# Patient Record
Sex: FEMALE | Race: WHITE
[De-identification: names, ages, dates, MRNs, and addresses within clinical notes are randomized per-mention and may not be internally consistent; named-entity substitution may affect disease eponyms.]

---

## 2018-05-20 ENCOUNTER — HOSPITAL ENCOUNTER (EMERGENCY)
Dept: HOSPITAL 74 - JERFT | Age: 43
Discharge: HOME | End: 2018-05-20
Payer: COMMERCIAL

## 2018-05-20 VITALS — DIASTOLIC BLOOD PRESSURE: 75 MMHG | TEMPERATURE: 98.4 F | SYSTOLIC BLOOD PRESSURE: 128 MMHG | HEART RATE: 91 BPM

## 2018-05-20 VITALS — BODY MASS INDEX: 26.6 KG/M2

## 2018-05-20 DIAGNOSIS — N30.01: Primary | ICD-10-CM

## 2018-05-20 LAB
APPEARANCE UR: (no result)
BACTERIA #/AREA URNS HPF: (no result) /HPF
BILIRUB UR STRIP.AUTO-MCNC: NEGATIVE MG/DL
COLOR UR: YELLOW
EPITH CASTS URNS QL MICRO: (no result) /HPF
HYALINE CASTS URNS QL MICRO: 11 /LPF
KETONES UR QL STRIP: NEGATIVE
LEUKOCYTE ESTERASE UR QL STRIP.AUTO: (no result)
MUCOUS THREADS URNS QL MICRO: (no result)
NITRITE UR QL STRIP: POSITIVE
PH UR: 5 [PH] (ref 5–8)
PROT UR QL STRIP: (no result)
PROT UR QL STRIP: NEGATIVE
SP GR UR: 1.01 (ref 1–1.03)
UROBILINOGEN UR STRIP-MCNC: NEGATIVE MG/DL (ref 0.2–1)

## 2018-05-20 NOTE — PDOC
History of Present Illness





- General


Chief Complaint: Urinary Problem


Stated Complaint: URINARY PROBLEM


Time Seen by Provider: 18 09:13


History Source: Patient


Exam Limitations: No Limitations





- History of Present Illness


Initial Comments: 





18 11:09


Pt. is a 43 y/o F who presents with three days of urinary burning and pain. Pt. 

states it hurts when she pees before and after. Admits to body aches. Denies 

fevers, chills, n/v/d. 





Past History





- Travel


Traveled outside of the country in the last 30 days: No


Close contact w/someone who was outside of country & ill: No





- Past Medical History


Allergies/Adverse Reactions: 


 Allergies











Allergy/AdvReac Type Severity Reaction Status Date / Time


 


No Known Allergies Allergy   Verified 18 08:58











Home Medications: 


Ambulatory Orders





Ondansetron HCl [Zofran] 4 mg PO TID PRN #12 tablet 16 


Phenazopyridine HCl [Pyridium -] 100 mg PO TID #21 tablet 18 


Sulfamethoxazole/Trimethoprim [Bactrim Ds Tablet] 1 each PO BID #28 tablet  








COPD: No





- Reproductive History


Spontaneous : 0





- Suicide/Smoking/Psychosocial Hx


Smoking Status: No


Smoking History: Never smoked


Number of Cigarettes Smoked Daily: 0


Drug/Substance Use Hx: No


Substance Use Type: None





**Review of Systems





- Review of Systems


Able to Perform ROS?: Yes


Comments:: 





18 09:13


CONSTITUTIONAL: 


Absent: fever, chills, diaphoresis, generalized weakness, malaise, loss of 

appetite


GASTROINTESTINAL:


Absent: abdominal pain, abdominal distension, nausea, vomiting, diarrhea, 

constipation, melena, hematochezia


GENITOURINARY: 


Present: dysuria, frequency, urgency Absent: hesitancy, hematuria, flank pain, 

genital pain


MUSCULOSKELETAL: 


Absent: myalgia, arthralgia, joint swelling


SKIN: 


Absent: rash, itching, pallor


NEUROLOGIC: 


Absent: headache, focal weakness or paresthesias, dizziness, unsteady gait, 

seizure, mental status changes, bladder or bowel incontinence





Is the patient limited English proficient: No





*Physical Exam





- Vital Signs


 Last Vital Signs











Temp Pulse Resp BP Pulse Ox


 


 98.4 F   91 H  18   128/75   99 


 


 18 08:56  18 08:56  18 08:56  18 08:56  18 08:56














- Physical Exam


Comments: 





18 09:13


GENERAL:


Well developed, well nourished. Awake and alert. No acute distress.


ABDOMINAL:


Suprapubic tenderness. Soft. Non-distended. No rebound or guarding. No 

organomegaly. Normoactive bowel sounds. 


MUSCULOSKELETAL 


Normal range of motion at all joints. No bony deformities or tenderness. CVA 

tenderness R


SKIN: 


Warm and dry. Normal capillary refill. No rashes. No jaundice. 


NEUROLOGICAL: 


Alert, awake, appropriate. Cranial nerves 2-12 intact. No deficits to light 

touch and temperature in face, upper extremities and lower extremities. No 

motor deficits in the in face, upper extremities and lower extremities. 

Normoreflexic in the upper and lower extremities. Normal speech. Toes are down-

going bilaterally. Gait is normal without ataxia.





Medical Decision Making





- Medical Decision Making





18 11:16


Pt. is a 43 y/o F with no PMH who presents to the ED c/o urinary discomfort for 

three days. Urine with 3+ Leuks, (+) nitrite and over 200 WBC's. Pt with mild 

CVA tenderness, possibly early pylo? Pt afebrile, without vomiting. Will dc 

home with abx. Return precautions given. Pt understands all dc instructions and 

all questions were answered.





*DC/Admit/Observation/Transfer


Diagnosis at time of Disposition: 


UTI (urinary tract infection)


Qualifiers:


 Urinary tract infection type: acute cystitis Hematuria presence: with 

hematuria Qualified Code(s): N30.01 - Acute cystitis with hematuria








- Discharge Dispostion


Disposition: HOME


Condition at time of disposition: Stable


Decision to Admit order: No





- Prescriptions


Prescriptions: 


Phenazopyridine HCl [Pyridium -] 100 mg PO TID #21 tablet


Sulfamethoxazole/Trimethoprim [Bactrim Ds Tablet] 1 each PO BID #28 tablet





- Referrals


Referrals: 


Ritika Jacinto MD [Primary Care Provider] - 





- Patient Instructions


Printed Discharge Instructions:  DI for Urinary Tract Infection (UTI)


Additional Instructions: 


You have a urinary tract infection. This caused by bacteria.


Please drink plenty of fluids.


Take your antibiotics as prescribed. Finish the entire dose even if you feel 

better.


You may take Tylenol or Motrin as needed for pain


Please follow up with your primary care doctor this week.





Return to the emergency department if you have fevers, chills, nausea, vomiting

, back pain, do not have improvement of your symptoms in 4-5 days or have any 

changes in your symptoms.





Usted tiene hina infeccin del tracto urinario. [place] causado por bacterias.


Por favor lorena muchos lquidos.


[place] wing antibiticos segn lo prescrito. Termine toda la dosis incluso si se 

siente mejor.


Puede paxton Tylenol o Motrin segn sea necesario para el dolor


Por favor, helen un seguimiento con stout mdico de atencin primaria esta semana.





Regrese al departamento de emergencias si tiene fiebre, escalofros, nuseas, v

mitos, dolor de espalda o tiene algn cambio en wing sntomas.


Print Language: Telugu





- Post Discharge Activity


Forms/Work/School Notes:  Back to Work

## 2018-05-23 NOTE — PDOC
Patient Follow-up (Call Back)





- Post ED Follow - Up


Condition at time of discharge: Stable


Disposition at time of original discharge: HOME


Reason for Call Back: Abnwl. Microbiology (+ urine culture, resistant to cactrim

, which patient was given Contacted patient who states she has ? flank pain but 

no fever or chills. Macrobid sent to pharmacy. Patient given strict return 

precautions)

## 2022-12-21 ENCOUNTER — HOSPITAL ENCOUNTER (EMERGENCY)
Dept: HOSPITAL 74 - JER | Age: 47
LOS: 1 days | Discharge: HOME | End: 2022-12-22
Payer: COMMERCIAL

## 2022-12-21 VITALS
DIASTOLIC BLOOD PRESSURE: 74 MMHG | RESPIRATION RATE: 19 BRPM | TEMPERATURE: 98.4 F | HEART RATE: 102 BPM | SYSTOLIC BLOOD PRESSURE: 108 MMHG

## 2022-12-21 VITALS — BODY MASS INDEX: 27.4 KG/M2

## 2022-12-21 DIAGNOSIS — J09.X2: Primary | ICD-10-CM

## 2022-12-21 PROCEDURE — 3E023GC INTRODUCTION OF OTHER THERAPEUTIC SUBSTANCE INTO MUSCLE, PERCUTANEOUS APPROACH: ICD-10-PCS

## 2022-12-22 LAB
APPEARANCE UR: CLEAR
BILIRUB UR STRIP.AUTO-MCNC: NEGATIVE MG/DL
COLOR UR: YELLOW
KETONES UR QL STRIP: (no result)
LEUKOCYTE ESTERASE UR QL STRIP.AUTO: NEGATIVE
NITRITE UR QL STRIP: NEGATIVE
PH UR: 5 [PH] (ref 5–8)
PROT UR QL STRIP: NEGATIVE
PROT UR QL STRIP: NEGATIVE
SP GR UR: 1.02 (ref 1.01–1.03)
UROBILINOGEN UR STRIP-MCNC: 0.2 MG/DL (ref 0.2–1)